# Patient Record
Sex: FEMALE | Race: WHITE | NOT HISPANIC OR LATINO
[De-identification: names, ages, dates, MRNs, and addresses within clinical notes are randomized per-mention and may not be internally consistent; named-entity substitution may affect disease eponyms.]

---

## 2017-02-06 PROBLEM — Z00.00 ENCOUNTER FOR PREVENTIVE HEALTH EXAMINATION: Status: ACTIVE | Noted: 2017-02-06

## 2017-02-07 ENCOUNTER — APPOINTMENT (OUTPATIENT)
Dept: UROGYNECOLOGY | Facility: CLINIC | Age: 67
End: 2017-02-07

## 2017-02-07 VITALS
HEIGHT: 64 IN | SYSTOLIC BLOOD PRESSURE: 110 MMHG | HEART RATE: 76 BPM | WEIGHT: 140 LBS | DIASTOLIC BLOOD PRESSURE: 80 MMHG | BODY MASS INDEX: 23.9 KG/M2

## 2017-02-07 DIAGNOSIS — Z78.9 OTHER SPECIFIED HEALTH STATUS: ICD-10-CM

## 2017-02-07 DIAGNOSIS — Z83.3 FAMILY HISTORY OF DIABETES MELLITUS: ICD-10-CM

## 2017-02-07 RX ORDER — SERTRALINE 25 MG/1
TABLET, FILM COATED ORAL
Refills: 0 | Status: ACTIVE | COMMUNITY

## 2017-02-07 RX ORDER — UBIDECARENONE/VIT E ACET 100MG-5
CAPSULE ORAL
Refills: 0 | Status: ACTIVE | COMMUNITY

## 2017-02-08 ENCOUNTER — RESULT REVIEW (OUTPATIENT)
Age: 67
End: 2017-02-08

## 2017-02-08 LAB
APPEARANCE: CLEAR
BACTERIA: NEGATIVE
BILIRUB UR QL STRIP: NORMAL
BILIRUBIN URINE: NEGATIVE
BLOOD URINE: ABNORMAL
CLARITY UR: CLEAR
COLLECTION METHOD: NORMAL
COLOR: YELLOW
GLUCOSE QUALITATIVE U: NORMAL MG/DL
GLUCOSE UR-MCNC: NORMAL
HCG UR QL: 0.2 EU/DL
HGB UR QL STRIP.AUTO: NORMAL
KETONES UR-MCNC: NORMAL
KETONES URINE: NEGATIVE
LEUKOCYTE ESTERASE UR QL STRIP: NORMAL
LEUKOCYTE ESTERASE URINE: NEGATIVE
MICROSCOPIC-UA: NORMAL
NITRITE UR QL STRIP: NORMAL
NITRITE URINE: NEGATIVE
PH UR STRIP: 7
PH URINE: 7
PROT UR STRIP-MCNC: NORMAL
PROTEIN URINE: NEGATIVE MG/DL
RED BLOOD CELLS URINE: 0 /HPF
SP GR UR STRIP: 1.01
SPECIFIC GRAVITY URINE: 1.01
SQUAMOUS EPITHELIAL CELLS: 0 /HPF
UROBILINOGEN URINE: NORMAL MG/DL
WHITE BLOOD CELLS URINE: 0 /HPF

## 2017-02-10 ENCOUNTER — RESULT REVIEW (OUTPATIENT)
Age: 67
End: 2017-02-10

## 2017-02-10 LAB — BACTERIA UR CULT: NORMAL

## 2017-02-20 ENCOUNTER — FORM ENCOUNTER (OUTPATIENT)
Age: 67
End: 2017-02-20

## 2017-02-21 ENCOUNTER — APPOINTMENT (OUTPATIENT)
Dept: ULTRASOUND IMAGING | Facility: CLINIC | Age: 67
End: 2017-02-21

## 2017-02-21 ENCOUNTER — OUTPATIENT (OUTPATIENT)
Dept: OUTPATIENT SERVICES | Facility: HOSPITAL | Age: 67
LOS: 1 days | End: 2017-02-21
Payer: COMMERCIAL

## 2017-02-21 DIAGNOSIS — Z00.8 ENCOUNTER FOR OTHER GENERAL EXAMINATION: ICD-10-CM

## 2017-02-21 PROCEDURE — 76856 US EXAM PELVIC COMPLETE: CPT | Mod: 26

## 2017-02-21 PROCEDURE — 76830 TRANSVAGINAL US NON-OB: CPT | Mod: 26

## 2017-02-21 PROCEDURE — 76856 US EXAM PELVIC COMPLETE: CPT

## 2017-02-21 PROCEDURE — 76830 TRANSVAGINAL US NON-OB: CPT

## 2017-02-22 ENCOUNTER — MESSAGE (OUTPATIENT)
Age: 67
End: 2017-02-22

## 2017-03-01 ENCOUNTER — OUTPATIENT (OUTPATIENT)
Dept: OUTPATIENT SERVICES | Facility: HOSPITAL | Age: 67
LOS: 1 days | End: 2017-03-01
Payer: COMMERCIAL

## 2017-03-01 ENCOUNTER — APPOINTMENT (OUTPATIENT)
Dept: UROGYNECOLOGY | Facility: CLINIC | Age: 67
End: 2017-03-01

## 2017-03-01 DIAGNOSIS — Z01.818 ENCOUNTER FOR OTHER PREPROCEDURAL EXAMINATION: ICD-10-CM

## 2017-03-01 PROCEDURE — 51797 INTRAABDOMINAL PRESSURE TEST: CPT

## 2017-03-01 PROCEDURE — 51784 ANAL/URINARY MUSCLE STUDY: CPT

## 2017-03-01 PROCEDURE — 51729 CYSTOMETROGRAM W/VP&UP: CPT

## 2017-03-02 DIAGNOSIS — N81.2 INCOMPLETE UTEROVAGINAL PROLAPSE: ICD-10-CM

## 2017-03-02 DIAGNOSIS — N32.81 OVERACTIVE BLADDER: ICD-10-CM

## 2017-03-02 DIAGNOSIS — N81.6 RECTOCELE: ICD-10-CM

## 2017-03-02 DIAGNOSIS — N81.11 CYSTOCELE, MIDLINE: ICD-10-CM

## 2017-05-24 ENCOUNTER — APPOINTMENT (OUTPATIENT)
Dept: UROGYNECOLOGY | Facility: CLINIC | Age: 67
End: 2017-05-24

## 2017-05-26 ENCOUNTER — RESULT REVIEW (OUTPATIENT)
Age: 67
End: 2017-05-26

## 2017-05-26 LAB — BACTERIA UR CULT: NORMAL

## 2017-06-02 ENCOUNTER — OUTPATIENT (OUTPATIENT)
Dept: OUTPATIENT SERVICES | Facility: HOSPITAL | Age: 67
LOS: 1 days | End: 2017-06-02
Payer: COMMERCIAL

## 2017-06-02 VITALS
HEART RATE: 74 BPM | WEIGHT: 147.93 LBS | RESPIRATION RATE: 16 BRPM | OXYGEN SATURATION: 99 % | SYSTOLIC BLOOD PRESSURE: 108 MMHG | HEIGHT: 64 IN | DIASTOLIC BLOOD PRESSURE: 72 MMHG | TEMPERATURE: 98 F

## 2017-06-02 DIAGNOSIS — F41.9 ANXIETY DISORDER, UNSPECIFIED: ICD-10-CM

## 2017-06-02 DIAGNOSIS — N81.2 INCOMPLETE UTEROVAGINAL PROLAPSE: ICD-10-CM

## 2017-06-02 DIAGNOSIS — Z98.890 OTHER SPECIFIED POSTPROCEDURAL STATES: Chronic | ICD-10-CM

## 2017-06-02 DIAGNOSIS — Z01.818 ENCOUNTER FOR OTHER PREPROCEDURAL EXAMINATION: ICD-10-CM

## 2017-06-02 DIAGNOSIS — N81.10 CYSTOCELE, UNSPECIFIED: ICD-10-CM

## 2017-06-02 DIAGNOSIS — Z98.51 TUBAL LIGATION STATUS: Chronic | ICD-10-CM

## 2017-06-02 LAB
ANION GAP SERPL CALC-SCNC: 17 MMOL/L — SIGNIFICANT CHANGE UP (ref 5–17)
BLD GP AB SCN SERPL QL: NEGATIVE — SIGNIFICANT CHANGE UP
BUN SERPL-MCNC: 16 MG/DL — SIGNIFICANT CHANGE UP (ref 7–23)
CALCIUM SERPL-MCNC: 10.3 MG/DL — SIGNIFICANT CHANGE UP (ref 8.4–10.5)
CHLORIDE SERPL-SCNC: 99 MMOL/L — SIGNIFICANT CHANGE UP (ref 96–108)
CO2 SERPL-SCNC: 23 MMOL/L — SIGNIFICANT CHANGE UP (ref 22–31)
CREAT SERPL-MCNC: 0.79 MG/DL — SIGNIFICANT CHANGE UP (ref 0.5–1.3)
GLUCOSE SERPL-MCNC: 94 MG/DL — SIGNIFICANT CHANGE UP (ref 70–99)
HCT VFR BLD CALC: 40.3 % — SIGNIFICANT CHANGE UP (ref 34.5–45)
HGB BLD-MCNC: 13.7 G/DL — SIGNIFICANT CHANGE UP (ref 11.5–15.5)
MCHC RBC-ENTMCNC: 29.7 PG — SIGNIFICANT CHANGE UP (ref 27–34)
MCHC RBC-ENTMCNC: 34 GM/DL — SIGNIFICANT CHANGE UP (ref 32–36)
MCV RBC AUTO: 87.4 FL — SIGNIFICANT CHANGE UP (ref 80–100)
PLATELET # BLD AUTO: 191 K/UL — SIGNIFICANT CHANGE UP (ref 150–400)
POTASSIUM SERPL-MCNC: 4.7 MMOL/L — SIGNIFICANT CHANGE UP (ref 3.5–5.3)
POTASSIUM SERPL-SCNC: 4.7 MMOL/L — SIGNIFICANT CHANGE UP (ref 3.5–5.3)
RBC # BLD: 4.61 M/UL — SIGNIFICANT CHANGE UP (ref 3.8–5.2)
RBC # FLD: 12.2 % — SIGNIFICANT CHANGE UP (ref 10.3–14.5)
RH IG SCN BLD-IMP: POSITIVE — SIGNIFICANT CHANGE UP
SODIUM SERPL-SCNC: 139 MMOL/L — SIGNIFICANT CHANGE UP (ref 135–145)
WBC # BLD: 4.91 K/UL — SIGNIFICANT CHANGE UP (ref 3.8–10.5)
WBC # FLD AUTO: 4.91 K/UL — SIGNIFICANT CHANGE UP (ref 3.8–10.5)

## 2017-06-02 PROCEDURE — 80048 BASIC METABOLIC PNL TOTAL CA: CPT

## 2017-06-02 PROCEDURE — 86850 RBC ANTIBODY SCREEN: CPT

## 2017-06-02 PROCEDURE — 85027 COMPLETE CBC AUTOMATED: CPT

## 2017-06-02 PROCEDURE — 86900 BLOOD TYPING SEROLOGIC ABO: CPT

## 2017-06-02 PROCEDURE — 86901 BLOOD TYPING SEROLOGIC RH(D): CPT

## 2017-06-02 PROCEDURE — G0463: CPT

## 2017-06-02 RX ORDER — CEFOTETAN DISODIUM 1 G
2 VIAL (EA) INJECTION ONCE
Qty: 0 | Refills: 0 | Status: DISCONTINUED | OUTPATIENT
Start: 2017-06-13 | End: 2017-06-14

## 2017-06-02 RX ORDER — FAMOTIDINE 10 MG/ML
20 INJECTION INTRAVENOUS ONCE
Qty: 0 | Refills: 0 | Status: COMPLETED | OUTPATIENT
Start: 2017-06-13 | End: 2017-06-13

## 2017-06-02 RX ORDER — ACETAMINOPHEN 500 MG
975 TABLET ORAL ONCE
Qty: 0 | Refills: 0 | Status: COMPLETED | OUTPATIENT
Start: 2017-06-13 | End: 2017-06-13

## 2017-06-02 RX ORDER — CELECOXIB 200 MG/1
200 CAPSULE ORAL ONCE
Qty: 0 | Refills: 0 | Status: COMPLETED | OUTPATIENT
Start: 2017-06-13 | End: 2017-06-13

## 2017-06-02 RX ORDER — OXYCODONE HYDROCHLORIDE 5 MG/1
10 TABLET ORAL ONCE
Qty: 0 | Refills: 0 | Status: DISCONTINUED | OUTPATIENT
Start: 2017-06-13 | End: 2017-06-13

## 2017-06-02 NOTE — H&P PST ADULT - PROBLEM SELECTOR PLAN 1
scheduled for cystoscopy, laparoscopic robotic sacral colpopexy, laparoscopic robotic super cervical hysterectomy  urine culture result report in chart

## 2017-06-02 NOTE — H&P PST ADULT - HISTORY OF PRESENT ILLNESS
66 year old female with h/o incomplete uterovaginal prolapse, scheduled for laparoscopic robotic sacral colpopexy, laparoscopic robotic super- cervical hysterectomy/ cystoscopy.

## 2017-06-13 ENCOUNTER — RESULT REVIEW (OUTPATIENT)
Age: 67
End: 2017-06-13

## 2017-06-13 ENCOUNTER — INPATIENT (INPATIENT)
Facility: HOSPITAL | Age: 67
LOS: 0 days | Discharge: HOME | DRG: 743 | End: 2017-06-14
Attending: UROLOGY | Admitting: UROLOGY
Payer: COMMERCIAL

## 2017-06-13 ENCOUNTER — TRANSCRIPTION ENCOUNTER (OUTPATIENT)
Age: 67
End: 2017-06-13

## 2017-06-13 ENCOUNTER — APPOINTMENT (OUTPATIENT)
Dept: UROGYNECOLOGY | Facility: HOSPITAL | Age: 67
End: 2017-06-13

## 2017-06-13 VITALS
HEART RATE: 72 BPM | HEIGHT: 64 IN | SYSTOLIC BLOOD PRESSURE: 127 MMHG | OXYGEN SATURATION: 98 % | TEMPERATURE: 98 F | RESPIRATION RATE: 18 BRPM | WEIGHT: 147.93 LBS | DIASTOLIC BLOOD PRESSURE: 82 MMHG

## 2017-06-13 DIAGNOSIS — N81.10 CYSTOCELE, UNSPECIFIED: ICD-10-CM

## 2017-06-13 DIAGNOSIS — N81.2 INCOMPLETE UTEROVAGINAL PROLAPSE: ICD-10-CM

## 2017-06-13 DIAGNOSIS — Z98.890 OTHER SPECIFIED POSTPROCEDURAL STATES: Chronic | ICD-10-CM

## 2017-06-13 DIAGNOSIS — N81.1 CYSTOCELE: ICD-10-CM

## 2017-06-13 DIAGNOSIS — Z98.51 TUBAL LIGATION STATUS: Chronic | ICD-10-CM

## 2017-06-13 LAB — RH IG SCN BLD-IMP: POSITIVE — SIGNIFICANT CHANGE UP

## 2017-06-13 PROCEDURE — 88305 TISSUE EXAM BY PATHOLOGIST: CPT | Mod: 26

## 2017-06-13 RX ORDER — ONDANSETRON 8 MG/1
4 TABLET, FILM COATED ORAL ONCE
Qty: 0 | Refills: 0 | Status: DISCONTINUED | OUTPATIENT
Start: 2017-06-13 | End: 2017-06-13

## 2017-06-13 RX ORDER — SODIUM CHLORIDE 9 MG/ML
1000 INJECTION, SOLUTION INTRAVENOUS
Qty: 0 | Refills: 0 | Status: DISCONTINUED | OUTPATIENT
Start: 2017-06-13 | End: 2017-06-14

## 2017-06-13 RX ORDER — SODIUM CHLORIDE 9 MG/ML
3 INJECTION INTRAMUSCULAR; INTRAVENOUS; SUBCUTANEOUS EVERY 8 HOURS
Qty: 0 | Refills: 0 | Status: DISCONTINUED | OUTPATIENT
Start: 2017-06-13 | End: 2017-06-13

## 2017-06-13 RX ORDER — ZOLPIDEM TARTRATE 10 MG/1
5 TABLET ORAL AT BEDTIME
Qty: 0 | Refills: 0 | Status: DISCONTINUED | OUTPATIENT
Start: 2017-06-13 | End: 2017-06-14

## 2017-06-13 RX ORDER — IBUPROFEN 200 MG
600 TABLET ORAL EVERY 6 HOURS
Qty: 0 | Refills: 0 | Status: DISCONTINUED | OUTPATIENT
Start: 2017-06-13 | End: 2017-06-14

## 2017-06-13 RX ORDER — OXYCODONE HYDROCHLORIDE 5 MG/1
5 TABLET ORAL
Qty: 0 | Refills: 0 | Status: DISCONTINUED | OUTPATIENT
Start: 2017-06-13 | End: 2017-06-14

## 2017-06-13 RX ORDER — ACETAMINOPHEN 500 MG
650 TABLET ORAL EVERY 6 HOURS
Qty: 0 | Refills: 0 | Status: DISCONTINUED | OUTPATIENT
Start: 2017-06-13 | End: 2017-06-14

## 2017-06-13 RX ORDER — OXYCODONE HYDROCHLORIDE 5 MG/1
10 TABLET ORAL EVERY 4 HOURS
Qty: 0 | Refills: 0 | Status: DISCONTINUED | OUTPATIENT
Start: 2017-06-13 | End: 2017-06-14

## 2017-06-13 RX ORDER — SIMETHICONE 80 MG/1
80 TABLET, CHEWABLE ORAL EVERY 6 HOURS
Qty: 0 | Refills: 0 | Status: DISCONTINUED | OUTPATIENT
Start: 2017-06-13 | End: 2017-06-14

## 2017-06-13 RX ORDER — DOCUSATE SODIUM 100 MG
100 CAPSULE ORAL THREE TIMES A DAY
Qty: 0 | Refills: 0 | Status: DISCONTINUED | OUTPATIENT
Start: 2017-06-13 | End: 2017-06-14

## 2017-06-13 RX ORDER — HYDROMORPHONE HYDROCHLORIDE 2 MG/ML
0.5 INJECTION INTRAMUSCULAR; INTRAVENOUS; SUBCUTANEOUS
Qty: 0 | Refills: 0 | Status: DISCONTINUED | OUTPATIENT
Start: 2017-06-13 | End: 2017-06-13

## 2017-06-13 RX ADMIN — SODIUM CHLORIDE 3 MILLILITER(S): 9 INJECTION INTRAMUSCULAR; INTRAVENOUS; SUBCUTANEOUS at 07:35

## 2017-06-13 RX ADMIN — SIMETHICONE 80 MILLIGRAM(S): 80 TABLET, CHEWABLE ORAL at 23:42

## 2017-06-13 RX ADMIN — Medication 100 MILLIGRAM(S): at 21:47

## 2017-06-13 RX ADMIN — HYDROMORPHONE HYDROCHLORIDE 0.5 MILLIGRAM(S): 2 INJECTION INTRAMUSCULAR; INTRAVENOUS; SUBCUTANEOUS at 15:30

## 2017-06-13 RX ADMIN — SIMETHICONE 80 MILLIGRAM(S): 80 TABLET, CHEWABLE ORAL at 17:54

## 2017-06-13 RX ADMIN — Medication 975 MILLIGRAM(S): at 07:24

## 2017-06-13 RX ADMIN — OXYCODONE HYDROCHLORIDE 5 MILLIGRAM(S): 5 TABLET ORAL at 21:15

## 2017-06-13 RX ADMIN — CELECOXIB 200 MILLIGRAM(S): 200 CAPSULE ORAL at 07:24

## 2017-06-13 RX ADMIN — OXYCODONE HYDROCHLORIDE 10 MILLIGRAM(S): 5 TABLET ORAL at 08:19

## 2017-06-13 RX ADMIN — ZOLPIDEM TARTRATE 5 MILLIGRAM(S): 10 TABLET ORAL at 21:47

## 2017-06-13 RX ADMIN — SODIUM CHLORIDE 75 MILLILITER(S): 9 INJECTION, SOLUTION INTRAVENOUS at 13:00

## 2017-06-13 RX ADMIN — Medication 600 MILLIGRAM(S): at 17:55

## 2017-06-13 RX ADMIN — Medication 600 MILLIGRAM(S): at 23:42

## 2017-06-13 RX ADMIN — FAMOTIDINE 20 MILLIGRAM(S): 10 INJECTION INTRAVENOUS at 07:24

## 2017-06-13 RX ADMIN — ZOLPIDEM TARTRATE 5 MILLIGRAM(S): 10 TABLET ORAL at 23:41

## 2017-06-13 RX ADMIN — OXYCODONE HYDROCHLORIDE 5 MILLIGRAM(S): 5 TABLET ORAL at 20:45

## 2017-06-13 RX ADMIN — SODIUM CHLORIDE 75 MILLILITER(S): 9 INJECTION, SOLUTION INTRAVENOUS at 16:43

## 2017-06-13 RX ADMIN — HYDROMORPHONE HYDROCHLORIDE 0.5 MILLIGRAM(S): 2 INJECTION INTRAMUSCULAR; INTRAVENOUS; SUBCUTANEOUS at 15:19

## 2017-06-13 RX ADMIN — Medication 600 MILLIGRAM(S): at 18:25

## 2017-06-13 NOTE — BRIEF OPERATIVE NOTE - OPERATION/FINDINGS
small uterus, normal adnexa  cystoscopy with no evidence of injury, no suture in bladder, clear bilateral ureteral efflux

## 2017-06-13 NOTE — PROGRESS NOTE ADULT - SUBJECTIVE AND OBJECTIVE BOX
POST-OP CHECK  PA Note    Allergies    No Known Allergies    S: Pt awake and alert resting comfortably in bed  Pain well controlled. Pt denies N/V, SOB, CP, palpitations. Pt tolerating ice chips, Denies flatus    O:   T(C): 36.5, Max: 37 (06-13 @ 12:25)  HR: 85 (83 - 99)  BP: 108/66 (107/55 - 132/63)  RR: 17 (16 - 17)  SpO2: 96% (93% - 100%)  Wt(kg): --  I&O's Summary    I & Os for current day (as of 13 Jun 2017 15:55)  =============================================  IN: 300 ml / OUT: 115 ml / NET: 185 ml                       OR         PACU  (I)              1500        IVF LR@125  (O)              200         40cc last hour ( 160 total in pacu)  EBL: 50    CV:  RRR  Lungs: CTA B/L  Abd: Hypoactive BS, soft, appropriately tender  Inc: Clean/dry/intact - 5 small sites, covered with bandages ( clean)  Ext: SCDs in place, Neg Homans B/L    A/P: 66y Female S/P robotic assisted STEFANO, BS, Sacral colpopexy, cystoscopy POD #0  -Neuro - AAO x 3 , pain well controlled with IV dilaudid  -Heme - hemodynamically stable  -CV - asymptomatic  -Pulm - encourage IS use, O2sat 96% on RA  -GI - Diet as Toelrated  - - Box to gravity, TOV in am  -DVT prophylaxis - encourage ambulation, SCDs while in bed, SQH  -Dispo - Pt stable for transfer to floor once all PACU milestones met    Lorrie Gallardo PA-C POST-OP CHECK  PA Note    Allergies    No Known Allergies    S: Pt awake and alert resting comfortably in bed  Pain well controlled. Pt denies N/V, SOB, CP, palpitations. Pt tolerating ice chips, Denies flatus    O:   T(C): 36.5, Max: 37 (06-13 @ 12:25)  HR: 85 (83 - 99)  BP: 108/66 (107/55 - 132/63)  RR: 17 (16 - 17)  SpO2: 96% (93% - 100%)  Wt(kg): --  I&O's Summary    I & Os for current day (as of 13 Jun 2017 15:55)  =============================================  IN: 300 ml / OUT: 115 ml / NET: 185 ml                       OR         PACU  (I)              1500        IVF LR@125  (O)              200         40cc last hour ( 160 total in pacu)  EBL: 50    CV:  RRR  Lungs: CTA B/L  Abd: Hypoactive BS, soft, appropriately tender  Inc: Clean/dry/intact - 5 small sites, covered with bandages ( clean)  Ext: SCDs in place, Neg Homans B/L    A/P: 66y Female S/P robotic assisted STEFANO, BS, Sacral colpopexy, cystoscopy POD #0  -Neuro - AAO x 3 , pain well controlled with IV dilaudid  -Heme - hemodynamically stable  -CV - asymptomatic, am CBC  -Pulm - encourage IS use, O2sat 96% on RA  -GI - Diet as Tolerated  - - Box to gravity, TOV in am  -DVT prophylaxis - encourage ambulation, SCDs while in bed, SQH  -Dispo - Pt stable for transfer to floor once all PACU milestones met    Lorrie Gallardo PA-C POST-OP CHECK  PA Note    Allergies    No Known Allergies    S: Pt awake and alert resting comfortably in bed  Pain well controlled. Pt denies N/V, SOB, CP, palpitations. Pt tolerating ice chips, Denies flatus    O:   T(C): 36.5, Max: 37 (06-13 @ 12:25)  HR: 85 (83 - 99)  BP: 108/66 (107/55 - 132/63)  RR: 17 (16 - 17)  SpO2: 96% (93% - 100%)  Wt(kg): --  I&O's Summary    I & Os for current day (as of 13 Jun 2017 15:55)  =============================================  IN: 300 ml / OUT: 115 ml / NET: 185 ml                       OR         PACU  (I)              1500        IVF LR@125  (O)              200         40cc last hour ( 160 total in pacu)  EBL: 50    CV:  RRR  Lungs: CTA B/L  Abd: Hypoactive BS, soft, appropriately tender  Inc: Clean/dry/intact - 5 small sites, covered with bandages ( clean)  Ext: SCDs in place, Neg Homans B/L    A/P: 66y Female S/P robotic assisted STEFANO, BS, Sacral colpopexy, cystoscopy POD #0  -Neuro - AAO x 3 , pain well controlled with IV dilaudid  -Heme - hemodynamically stable  -CV - asymptomatic, am CBC  -Pulm - encourage IS use, O2sat 96% on RA  -GI - Diet as Tolerated  - - Box to gravity, TOV in am  -DVT prophylaxis - encourage ambulation, SCDs while in bed  -Dispo - Pt stable for transfer to floor once all PACU milestones met    Lorrie Gallardo PA-C

## 2017-06-13 NOTE — BRIEF OPERATIVE NOTE - POST-OP DX
Incomplete uterovaginal prolapse  06/13/2017    Erma Horta  Midline cystocele  06/13/2017    Erma Horta

## 2017-06-13 NOTE — BRIEF OPERATIVE NOTE - PROCEDURE
Hysterectomy, robot-assisted, with sacrocolpopexy and cystoscopy  06/13/2017  supracervical hysterctomy, bilateral salpingectomy,  Active  DOSHAUGH

## 2017-06-14 VITALS
SYSTOLIC BLOOD PRESSURE: 128 MMHG | OXYGEN SATURATION: 96 % | TEMPERATURE: 98 F | DIASTOLIC BLOOD PRESSURE: 80 MMHG | HEART RATE: 60 BPM | RESPIRATION RATE: 18 BRPM

## 2017-06-14 DIAGNOSIS — Z98.890 OTHER SPECIFIED POSTPROCEDURAL STATES: ICD-10-CM

## 2017-06-14 LAB
BASOPHILS # BLD AUTO: 0 K/UL — SIGNIFICANT CHANGE UP (ref 0–0.2)
BASOPHILS NFR BLD AUTO: 0.2 % — SIGNIFICANT CHANGE UP (ref 0–2)
EOSINOPHIL # BLD AUTO: 0 K/UL — SIGNIFICANT CHANGE UP (ref 0–0.5)
EOSINOPHIL NFR BLD AUTO: 0.2 % — SIGNIFICANT CHANGE UP (ref 0–6)
HCT VFR BLD CALC: 34 % — LOW (ref 34.5–45)
HGB BLD-MCNC: 11.6 G/DL — SIGNIFICANT CHANGE UP (ref 11.5–15.5)
LYMPHOCYTES # BLD AUTO: 1.3 K/UL — SIGNIFICANT CHANGE UP (ref 1–3.3)
LYMPHOCYTES # BLD AUTO: 14.3 % — SIGNIFICANT CHANGE UP (ref 13–44)
MCHC RBC-ENTMCNC: 31 PG — SIGNIFICANT CHANGE UP (ref 27–34)
MCHC RBC-ENTMCNC: 34.2 GM/DL — SIGNIFICANT CHANGE UP (ref 32–36)
MCV RBC AUTO: 90.6 FL — SIGNIFICANT CHANGE UP (ref 80–100)
MONOCYTES # BLD AUTO: 0.8 K/UL — SIGNIFICANT CHANGE UP (ref 0–0.9)
MONOCYTES NFR BLD AUTO: 8.8 % — SIGNIFICANT CHANGE UP (ref 2–14)
NEUTROPHILS # BLD AUTO: 6.7 K/UL — SIGNIFICANT CHANGE UP (ref 1.8–7.4)
NEUTROPHILS NFR BLD AUTO: 76.4 % — SIGNIFICANT CHANGE UP (ref 43–77)
PLATELET # BLD AUTO: 142 K/UL — LOW (ref 150–400)
RBC # BLD: 3.76 M/UL — LOW (ref 3.8–5.2)
RBC # FLD: 11 % — SIGNIFICANT CHANGE UP (ref 10.3–14.5)
WBC # BLD: 8.8 K/UL — SIGNIFICANT CHANGE UP (ref 3.8–10.5)
WBC # FLD AUTO: 8.8 K/UL — SIGNIFICANT CHANGE UP (ref 3.8–10.5)

## 2017-06-14 PROCEDURE — 88305 TISSUE EXAM BY PATHOLOGIST: CPT

## 2017-06-14 PROCEDURE — 86900 BLOOD TYPING SEROLOGIC ABO: CPT

## 2017-06-14 PROCEDURE — C1781: CPT

## 2017-06-14 PROCEDURE — 86901 BLOOD TYPING SEROLOGIC RH(D): CPT

## 2017-06-14 PROCEDURE — 85027 COMPLETE CBC AUTOMATED: CPT

## 2017-06-14 PROCEDURE — S2900: CPT

## 2017-06-14 RX ORDER — OXYCODONE HYDROCHLORIDE 5 MG/1
1 TABLET ORAL
Qty: 20 | Refills: 0 | OUTPATIENT
Start: 2017-06-14

## 2017-06-14 RX ORDER — SERTRALINE 25 MG/1
1 TABLET, FILM COATED ORAL
Qty: 0 | Refills: 0 | COMMUNITY

## 2017-06-14 RX ORDER — OXYCODONE HYDROCHLORIDE 5 MG/1
1 TABLET ORAL
Qty: 0 | Refills: 0 | COMMUNITY
Start: 2017-06-14

## 2017-06-14 RX ORDER — POLYETHYLENE GLYCOL 3350 17 G/17G
0 POWDER, FOR SOLUTION ORAL
Qty: 0 | Refills: 0 | COMMUNITY

## 2017-06-14 RX ORDER — CYCLOSPORINE 0.5 MG/ML
1 EMULSION OPHTHALMIC
Qty: 0 | Refills: 0 | COMMUNITY

## 2017-06-14 RX ADMIN — Medication 600 MILLIGRAM(S): at 05:27

## 2017-06-14 RX ADMIN — Medication 650 MILLIGRAM(S): at 09:33

## 2017-06-14 RX ADMIN — Medication 100 MILLIGRAM(S): at 12:44

## 2017-06-14 RX ADMIN — Medication 600 MILLIGRAM(S): at 12:44

## 2017-06-14 RX ADMIN — Medication 600 MILLIGRAM(S): at 13:15

## 2017-06-14 RX ADMIN — SIMETHICONE 80 MILLIGRAM(S): 80 TABLET, CHEWABLE ORAL at 05:27

## 2017-06-14 RX ADMIN — Medication 600 MILLIGRAM(S): at 05:57

## 2017-06-14 RX ADMIN — Medication 600 MILLIGRAM(S): at 00:12

## 2017-06-14 RX ADMIN — Medication 650 MILLIGRAM(S): at 10:03

## 2017-06-14 RX ADMIN — SIMETHICONE 80 MILLIGRAM(S): 80 TABLET, CHEWABLE ORAL at 12:44

## 2017-06-14 RX ADMIN — Medication 100 MILLIGRAM(S): at 05:27

## 2017-06-14 NOTE — DISCHARGE NOTE ADULT - PATIENT PORTAL LINK FT
“You can access the [employer] Patient Portal, offered by [facility], by registering with the following website: [email or web address]”

## 2017-06-14 NOTE — DISCHARGE NOTE ADULT - MEDICATION SUMMARY - MEDICATIONS TO TAKE
I will START or STAY ON the medications listed below when I get home from the hospital:    oxyCODONE 5 mg oral tablet  -- 1 tab(s) by mouth every 4 hours, As Needed -for severe pain MDD:5 tablets per day  -- Caution federal law prohibits the transfer of this drug to any person other  than the person for whom it was prescribed.  It is very important that you take or use this exactly as directed.  Do not skip doses or discontinue unless directed by your doctor.  May cause drowsiness.  Alcohol may intensify this effect.  Use care when operating dangerous machinery.  This prescription cannot be refilled.  Using more of this medication than prescribed may cause serious breathing problems.    -- Indication: For pain management    MiraLax oral powder for reconstitution  --  by mouth   -- Indication: For Health maintenance

## 2017-06-14 NOTE — DISCHARGE NOTE ADULT - CARE PLAN
Principal Discharge DX:	Incomplete uterovaginal prolapse  Goal:	routine postop care  Instructions for follow-up, activity and diet:	routine activities . no strenuous exercise, nothing in vagina, follow up post op visit at GYN in two weeks

## 2017-06-14 NOTE — DISCHARGE NOTE ADULT - PLAN OF CARE
routine postop care routine activities . no strenuous exercise, nothing in vagina, follow up post op visit at GYN in two weeks

## 2017-06-14 NOTE — DISCHARGE NOTE ADULT - CARE PROVIDER_API CALL
Jan Ibarra), Female [facility]; Obstetrics and Gynecology  [address] [place] [address]  Phone: [phone]  Fax: [phone]

## 2017-06-14 NOTE — PROGRESS NOTE ADULT - SUBJECTIVE AND OBJECTIVE BOX
TOV Note    Active Trial of Void performed.   250cc NS instilled into the bladder by gravity.   Box D/C'd  Pt voided  300  cc   Box catheter  to remain out.            Karen Dorantes PA-C

## 2017-06-14 NOTE — DISCHARGE NOTE ADULT - INSTRUCTIONS
If unable to tolerate diet, nausea, vomiting, fever above 100.3, chills, or an increase in pain, increase in bleeding, notify provider or return to ER.

## 2017-06-14 NOTE — PROGRESS NOTE ADULT - SUBJECTIVE AND OBJECTIVE BOX
POD#1    Patient seen and examined at bedside, no acute overnight events. Pt just sat down from walking three laps around the hospital floor. Pain well controlled with Motrin / Oxycodone prn. Box in place.     Patient is ambulating, passing flatus, and tolerating regular diet. Denies CP, SOB, N/V, fevers, and chills.    Vital Signs Last 24 Hours  T(C): 36.9, Max: 37 (06-13 @ 12:25)  HR: 79 (71 - 99)  BP: 108/68 (92/57 - 132/63)  RR: 16 (16 - 18)  SpO2: 96% (93% - 100%)    I&O's Summary    I & Os for current day (as of 14 Jun 2017 06:52)  =============================================  IN: 2300 ml / OUT: 2055 ml / NET: 245 ml    Physical Exam:  General: NAD  CV: NR, RR, S1, S2, no M/R/G  Lungs: CTA-B  Abdomen: Soft, Moderate distension with gas, non-tender, +BS  Incision: 5 port sites covered with bandages, c/d/i  Ext: No pain or swelling    Labs:                        11.6   8.8   )-----------( 142      ( 14 Jun 2017 05:34 )             34.0   baso 0.2    eos 0.2    imm gran x      lymph 14.3   mono 8.8    poly 76.4     Blood Type: O Positive      MEDICATIONS  (STANDING):  cefoTEtan  IVPB 2Gram(s) IV Intermittent once  lactated ringers. 1000milliLiter(s) IV Continuous <Continuous>  ibuprofen  Tablet 600milliGRAM(s) Oral every 6 hours  docusate sodium 100milliGRAM(s) Oral three times a day  simethicone 80milliGRAM(s) Chew every 6 hours    MEDICATIONS  (PRN):  acetaminophen   Tablet. 650milliGRAM(s) Oral every 6 hours PRN Mild Pain (1 - 3)  oxyCODONE IR 5milliGRAM(s) Oral every 3 hours PRN Moderate Pain (4 - 6)  oxyCODONE IR 10milliGRAM(s) Oral every 4 hours PRN Severe Pain (7 - 10)  zolpidem 5milliGRAM(s) Oral at bedtime PRN Insomnia

## 2017-06-14 NOTE — PROGRESS NOTE ADULT - PROBLEM SELECTOR PLAN 1
Neuro: c/w po pain meds. Home sertraline for h/o anxiety  CV: Hemodynamically stable  Pulm: Saturating well on room air, encourage oob/amb  GI: c/w regular diet  : Box in place, trial of void this AM  Heme: SCDs for DVT ppx. f/u AM CBC this AM  Dispo: Continue routine post-op care

## 2017-06-14 NOTE — PROGRESS NOTE ADULT - ASSESSMENT
67 yo POD#1 status post Robotic-assisted STEFANO, BS, Sacral colpopexy, Cystoscopy. EBL: 50 - stable

## 2017-06-19 LAB — SURGICAL PATHOLOGY STUDY: SIGNIFICANT CHANGE UP

## 2017-06-28 ENCOUNTER — APPOINTMENT (OUTPATIENT)
Dept: UROGYNECOLOGY | Facility: CLINIC | Age: 67
End: 2017-06-28

## 2017-06-28 DIAGNOSIS — N81.11 CYSTOCELE, MIDLINE: ICD-10-CM

## 2017-06-28 DIAGNOSIS — N81.2 INCOMPLETE UTEROVAGINAL PROLAPSE: ICD-10-CM

## 2017-07-17 ENCOUNTER — APPOINTMENT (OUTPATIENT)
Dept: UROGYNECOLOGY | Facility: CLINIC | Age: 67
End: 2017-07-17

## 2018-04-26 ENCOUNTER — APPOINTMENT (OUTPATIENT)
Dept: OBGYN | Facility: CLINIC | Age: 68
End: 2018-04-26
Payer: COMMERCIAL

## 2018-04-26 VITALS
DIASTOLIC BLOOD PRESSURE: 76 MMHG | WEIGHT: 148 LBS | BODY MASS INDEX: 25.27 KG/M2 | SYSTOLIC BLOOD PRESSURE: 122 MMHG | HEART RATE: 79 BPM | HEIGHT: 64 IN

## 2018-04-26 DIAGNOSIS — N39.41 URGE INCONTINENCE: ICD-10-CM

## 2018-04-26 DIAGNOSIS — Z01.419 ENCOUNTER FOR GYNECOLOGICAL EXAMINATION (GENERAL) (ROUTINE) W/OUT ABNORMAL FINDINGS: ICD-10-CM

## 2018-04-26 DIAGNOSIS — Z98.890 OTHER SPECIFIED POSTPROCEDURAL STATES: ICD-10-CM

## 2018-04-26 DIAGNOSIS — N32.81 OVERACTIVE BLADDER: ICD-10-CM

## 2018-04-26 DIAGNOSIS — Z87.39 PERSONAL HISTORY OF OTHER DISEASES OF THE MUSCULOSKELETAL SYSTEM AND CONNECTIVE TISSUE: ICD-10-CM

## 2018-04-26 DIAGNOSIS — N95.2 POSTMENOPAUSAL ATROPHIC VAGINITIS: ICD-10-CM

## 2018-04-26 DIAGNOSIS — N36.41 HYPERMOBILITY OF URETHRA: ICD-10-CM

## 2018-04-26 PROCEDURE — 99387 INIT PM E/M NEW PAT 65+ YRS: CPT

## 2018-05-09 LAB
CYTOLOGY CVX/VAG DOC THIN PREP: NORMAL
HPV HIGH+LOW RISK DNA PNL CVX: NOT DETECTED

## 2018-05-10 ENCOUNTER — OTHER (OUTPATIENT)
Age: 68
End: 2018-05-10

## 2018-05-15 PROBLEM — Z87.39 HISTORY OF OSTEOPENIA: Status: RESOLVED | Noted: 2018-05-15 | Resolved: 2018-05-15
